# Patient Record
(demographics unavailable — no encounter records)

---

## 2024-10-14 NOTE — PHYSICAL EXAM
[de-identified] : Patient is tender to palpation A1 pulley left thumb.  There is triggering present.  There is normal sensation normal cap refill.  No erythema ecchymoses or abrasions.  No other fingers trigger

## 2024-10-14 NOTE — ASSESSMENT
[FreeTextEntry1] : Patient is left-sided trigger thumb.  Patient received initial cortisone injection into her thumb today.  She tolerated well.  Will see how the injection does.  Arrangements to see us back in a month for potential second injection.

## 2024-10-14 NOTE — HISTORY OF PRESENT ILLNESS
[de-identified] : 64-year-old female with left-sided thumb pain and discomfort.  Comes in today for evaluation.  This the first time she is having this pain and discomfort.  The clicks on her.  At first it was not clicking at all and now it started to click.

## 2024-11-11 NOTE — PHYSICAL EXAM
[de-identified] : Patient is tender to palpation A1 pulley left thumb.  She has mild triggering present.  There is no erythema ecchymoses or abrasions.

## 2024-11-11 NOTE — ASSESSMENT
[FreeTextEntry1] : Patient is left-sided trigger thumb.  Patient received her second injection today.  She tolerated well.  Will see how the injection does.  Hope it helped relieve her symptoms.

## 2024-11-11 NOTE — HISTORY OF PRESENT ILLNESS
[de-identified] : 64-year-old female left-sided thumb pain discomfort.  She had a cortisone injection at her last visit.  It did help to relieve her symptoms to some degree.  She comes in for the evaluation today.